# Patient Record
Sex: MALE | URBAN - METROPOLITAN AREA
[De-identification: names, ages, dates, MRNs, and addresses within clinical notes are randomized per-mention and may not be internally consistent; named-entity substitution may affect disease eponyms.]

---

## 2017-06-07 ENCOUNTER — THERAPY VISIT (OUTPATIENT)
Dept: PHYSICAL THERAPY | Facility: CLINIC | Age: 57
End: 2017-06-07
Payer: MEDICARE

## 2017-06-07 DIAGNOSIS — E11.9 DIABETES MELLITUS, TYPE 2 (H): ICD-10-CM

## 2017-06-07 DIAGNOSIS — M79.671 CHRONIC PAIN IN RIGHT FOOT: Primary | ICD-10-CM

## 2017-06-07 DIAGNOSIS — G89.29 CHRONIC PAIN IN RIGHT FOOT: Primary | ICD-10-CM

## 2017-06-07 PROCEDURE — 97110 THERAPEUTIC EXERCISES: CPT | Mod: GP | Performed by: PHYSICAL THERAPIST

## 2017-06-07 PROCEDURE — G8979 MOBILITY GOAL STATUS: HCPCS | Mod: GP | Performed by: PHYSICAL THERAPIST

## 2017-06-07 PROCEDURE — G8978 MOBILITY CURRENT STATUS: HCPCS | Mod: GP | Performed by: PHYSICAL THERAPIST

## 2017-06-07 NOTE — LETTER
DEPARTMENT OF HEALTH AND HUMAN SERVICES  CENTERS FOR MEDICARE & MEDICAID SERVICES    PLAN/UPDATED PLAN OF PROGRESS FOR OUTPATIENT REHABILITATION    PATIENTS NAME:  Edis Card   : 1960  PROVIDER NUMBER:    [id number]  [id number]   PROVIDER NAME: [facility]  MEDICAL RECORD NUMBER: [id number]   START OF CARE DATE:  SOC Date: 17   TYPE:  PT    PRIMARY/TREATMENT DIAGNOSIS: (Pertinent Medical Diagnosis)     Chronic pain in right foot  Diabetes mellitus, type 2 (H)    VISITS FROM START OF CARE:  Rxs Used: 1     [facility] Initial Evaluation  Subjective:  Patient is a 56 year old male presenting with rehab right ankle/foot hpi. The history is provided by the patient. A  was used.   Edis Card is a 56 year old male with a right ankle and right foot condition.  Condition occurred with:  Insidious onset.  Condition occurred: for unknown reasons.  This is a chronic condition  MD Referral 17  Edis reports pain R>L foot beginning 1 year ago.   Has experienced 2 strokes affecting right side of body . Gradually returning to work and adding hours. Currently working 4 hours in cleaning. Edis's goal is to return to work 8 hours.   Edis did not want to come to therapy today .    Patient reports pain:  Longitudinal arch, lateral and medial.    Pain is described as aching and is intermittent (with tylenol ) and reported as 7/10.  Associated symptoms:  Numbness and loss of strength. Pain is worse in the P.M. (only able to work 4 hours).  Symptoms are exacerbated by standing, walking, activity and weight bearing and relieved by rest (tylenol and plans to start taking gamapentin).  Since onset symptoms are gradually worsening.  Special testing: no imaging.  Previous treatment includes physical therapy (instructed in exercises 2017 @ [facility]).  There was no improvement following previous treatment.  General health as reported by patient is  fair.       Red flags:  Calf pain, swelling, warmth, unexplained weight loss, pain at rest/night, chest pain and bilateral numbness.      Objective:  Standing Alignment:    Ankle/Foot:  Normal  Gait:    Gait Type:  Antalgic     Deviations:  General Deviations:  Base of support incr  Flexibility/Screens:   Lower Extremity:  Decreased right lower extremity flexibility:  Hamstrings and Gastroc    Ankle/Foot Evaluation  ROM:    PROM:    Dorsiflexion:  Left:    5     Right:   5   Great Toe Extension:  Left:    90     Right: 90  Strength wnl ankle: Only able to perform 3 single leg raises right LE.  LIGAMENT TESTING: normal  SPECIAL TESTS: not assessed  PALPATION:   Right ankle tenderness present at:   gastroc/soleus and plantar fascia  Right ankle tenderness not present at:  medial calcaneal  PATIENTS NAME:  Edis Card   : 1960    EDEMA: normal  MOBILITY TESTING: not assessed  FUNCTIONAL TESTS: not assessed      General Evaluation:  Balance:    Single Leg Stance--Eyes Open:  Left: 2/30 sec    Right: 2/30 sec  Single Leg Stance--Eyes Closed:  Left: 0/30 sec    Right: 0/30 sec    Sit to Stand Test: 7 ( with foot and back pain)/reps  Endurance Assessment:    Resting Heart Rate: 80/bpm at rest   /bpm after activity    Assessment/Plan:    Patient is a 56 year old male with Right Foot and Ankle Pain/Weakness/Numbness complaints.    Patient has the following significant findings with corresponding treatment plan.                Diagnosis 1:  Neuropathy per MD   Pain -  US, manual therapy, self management, education and home program  Decreased ROM/flexibility - manual therapy, therapeutic exercise, therapeutic activity and home program  Decreased strength - therapeutic exercise, therapeutic activities and home program  Impaired balance - neuro re-education, gait training, therapeutic activities and home program  Decreased proprioception - neuro re-education, gait training, therapeutic activities and home  program  Impaired gait - gait training and home program  Impaired muscle performance - neuro re-education and home program  Decreased function - therapeutic activities and home program    Therapy Evaluation Codes:   1) History comprised of:   Personal factors that impact the plan of care:      Language, Past/current experiences, Time since onset of symptoms and Work status.    Comorbidity factors that impact the plan of care are:      Chest pain, Diabetes, High blood pressure, Numbness/tingling, Pain at night/rest, Stroke, Weakness and Unexplained Weight Loss and Kidney Disease.     Medications impacting care: Gamapentin and Tylenol.  2) Examination of Body Systems comprised of:   Body structures and functions that impact the plan of care:      Right Foot.   Activity limitations that impact the plan of care are:      Standing, Walking and Working.  3) Clinical presentation characteristics are:   Unstable/Unpredictable.  4) Decision-Making    High complexity using standardized patient assessment instrument and/or measureable assessment of functional outcome.  Cumulative Therapy Evaluation is: High complexity.    Previous and current functional limitations:  (See Goal Flow Sheet for this information)    Short term and Long term goals: (See Goal Flow Sheet for this information)     Communication ability:  Patient has an  for communication clarity.  Treatment Explanation - The following has been discussed with the patient:     RX ordered/plan of care  Anticipated outcomes  Possible risks and side effects  This patient would benefit from PT intervention to resume normal activities.   Rehab potential is fair.      PATIENTS NAME:  Edis Card   : 1960    Frequency:  1 X week, once daily  Duration:  for 6 weeks  Discharge Plan:  Achieve all LTG.  Independent in home treatment program.  Reach maximal therapeutic benefit.  Progress from 4 hours to 8 hours of work.      Caregiver Signature/Credentials  "_____________________________ Date ________      Treating Provider: Yany Luna PT   I have reviewed and certified the need for these services and plan of treatment while under my care.        PHYSICIAN'S SIGNATURE:   _________________________________________  Date___________     Brooke Maza    Certification period:  Beginning of Cert date period: 06/07/17 to  End of Cert period date: 09/04/17     Functional Level Progress Report: Please see attached \"Goal Flow sheet for Functional level.\"    ____X____ Continue Services or       ________ DC Services                Service dates: From  SOC Date: 06/07/17 date to present                         "

## 2017-06-07 NOTE — PROGRESS NOTES
[facility] Initial Evaluation    Subjective:    Patient is a 56 year old male presenting with rehab right ankle/foot hpi. The history is provided by the patient. A  was used.   Edis Card is a 56 year old male with a right ankle and right foot condition.  Condition occurred with:  Insidious onset.  Condition occurred: for unknown reasons.  This is a chronic condition  MD Referral 5-25-17  Edis reports pain R>L foot beginning 1 year ago.   Has experienced 2 strokes affecting right side of body 2011. Gradually returning to work and adding hours. Currently working 4 hours in cleaning. Edis's goal is to return to work 8 hours.   Edis did not want to come to therapy today .    Patient reports pain:  Longitudinal arch, lateral and medial.    Pain is described as aching and is intermittent (with tylenol ) and reported as 7/10.  Associated symptoms:  Numbness and loss of strength. Pain is worse in the P.M. (only able to work 4 hours).  Symptoms are exacerbated by standing, walking, activity and weight bearing and relieved by rest (tylenol and plans to start taking gamapentin).  Since onset symptoms are gradually worsening.  Special testing: no imaging.  Previous treatment includes physical therapy (instructed in exercises Jan 2017 @ [facility]).  There was no improvement following previous treatment.  General health as reported by patient is fair.                      Red flags:  Calf pain, swelling, warmth, unexplained weight loss, pain at rest/night, chest pain and bilateral numbness.                        Objective:    Standing Alignment:                Ankle/Foot:  Normal    Gait:    Gait Type:  Antalgic     Deviations:  General Deviations:  Base of support incr    Flexibility/Screens:       Lower Extremity:      Decreased right lower extremity flexibility:  Hamstrings and Gastroc          Ankle/Foot Evaluation  ROM:      PROM:    Dorsiflexion:  Left:    5     Right:   5            Great Toe Extension:  Left:    90     Right: 90      Strength wnl ankle: Only able to perform 3 single leg raises right LE.  LIGAMENT TESTING: normal              SPECIAL TESTS: not assessed    PALPATION:     Right ankle tenderness present at:   gastroc/soleus and plantar fascia  Right ankle tenderness not present at:  medial calcaneal  EDEMA: normal          MOBILITY TESTING: not assessed              FUNCTIONAL TESTS: not assessed                                                                  General Evaluation:                      Balance:    Single Leg Stance--Eyes Open:  Left: 2/30 sec    Right: 2/30 sec  Single Leg Stance--Eyes Closed:  Left: 0/30 sec    Right: 0/30 sec    Sit to Stand Test: 7 ( with foot and back pain)/reps        Endurance Assessment:    Resting Heart Rate: 80/bpm at rest   /bpm after activity                                             ROS    Assessment/Plan:      Patient is a 56 year old male with Right Foot and Ankle Pain/Weakness/Numbness complaints.    Patient has the following significant findings with corresponding treatment plan.                Diagnosis 1:  Neuropathy per MD   Pain -  US, manual therapy, self management, education and home program  Decreased ROM/flexibility - manual therapy, therapeutic exercise, therapeutic activity and home program  Decreased strength - therapeutic exercise, therapeutic activities and home program  Impaired balance - neuro re-education, gait training, therapeutic activities and home program  Decreased proprioception - neuro re-education, gait training, therapeutic activities and home program  Impaired gait - gait training and home program  Impaired muscle performance - neuro re-education and home program  Decreased function - therapeutic activities and home program    Therapy Evaluation Codes:   1) History comprised of:   Personal factors that impact the plan of care:      Language, Past/current experiences, Time since onset of  symptoms and Work status.    Comorbidity factors that impact the plan of care are:      Chest pain, Diabetes, High blood pressure, Numbness/tingling, Pain at night/rest, Stroke, Weakness and Unexplained Weight Loss and Kidney Disease.     Medications impacting care: Gamapentin and Tylenol.  2) Examination of Body Systems comprised of:   Body structures and functions that impact the plan of care:      Right Foot.   Activity limitations that impact the plan of care are:      Standing, Walking and Working.  3) Clinical presentation characteristics are:   Unstable/Unpredictable.  4) Decision-Making    High complexity using standardized patient assessment instrument and/or measureable assessment of functional outcome.  Cumulative Therapy Evaluation is: High complexity.    Previous and current functional limitations:  (See Goal Flow Sheet for this information)    Short term and Long term goals: (See Goal Flow Sheet for this information)     Communication ability:  Patient has an  for communication clarity.  Treatment Explanation - The following has been discussed with the patient:   RX ordered/plan of care  Anticipated outcomes  Possible risks and side effects  This patient would benefit from PT intervention to resume normal activities.   Rehab potential is fair.    Frequency:  1 X week, once daily  Duration:  for 6 weeks  Discharge Plan:  Achieve all LTG.  Independent in home treatment program.  Reach maximal therapeutic benefit.  Progress from 4 hours to 8 hours of work.    Please refer to the daily flowsheet for treatment today, total treatment time and time spent performing 1:1 timed codes.

## 2017-06-08 NOTE — PROGRESS NOTES
Subjective:    Patient is a 56 year old male presenting with rehab left ankle/foot hpi.                                      Pertinent medical history includes:  High blood pressure, stroke and diabetes.    Other surgeries include:  Other.    Current occupation is Cleaning.                                    Objective:    System    Physical Exam    General     ROS    Assessment/Plan:

## 2017-09-12 PROBLEM — E11.9 DIABETES MELLITUS, TYPE 2 (H): Status: RESOLVED | Noted: 2017-06-07 | Resolved: 2017-09-12

## 2017-09-12 PROBLEM — M79.671 CHRONIC PAIN IN RIGHT FOOT: Status: RESOLVED | Noted: 2017-06-07 | Resolved: 2017-09-12

## 2017-09-12 PROBLEM — G89.29 CHRONIC PAIN IN RIGHT FOOT: Status: RESOLVED | Noted: 2017-06-07 | Resolved: 2017-09-12

## 2017-09-12 NOTE — PROGRESS NOTES
Please refer to the initial evaluation completed on 6-8-17 for discharge information. No further appts scheduled.

## 2018-05-03 ENCOUNTER — THERAPY VISIT (OUTPATIENT)
Dept: PHYSICAL THERAPY | Facility: CLINIC | Age: 58
End: 2018-05-03
Payer: MEDICARE

## 2018-05-03 DIAGNOSIS — M54.16 LUMBAR BACK PAIN WITH RADICULOPATHY AFFECTING RIGHT LOWER EXTREMITY: Primary | ICD-10-CM

## 2018-05-03 PROCEDURE — 97112 NEUROMUSCULAR REEDUCATION: CPT | Mod: GP | Performed by: PHYSICAL THERAPIST

## 2018-05-03 PROCEDURE — 97161 PT EVAL LOW COMPLEX 20 MIN: CPT | Mod: GP | Performed by: PHYSICAL THERAPIST

## 2018-05-03 PROCEDURE — G8979 MOBILITY GOAL STATUS: HCPCS | Mod: GP | Performed by: PHYSICAL THERAPIST

## 2018-05-03 PROCEDURE — 97110 THERAPEUTIC EXERCISES: CPT | Mod: GP | Performed by: PHYSICAL THERAPIST

## 2018-05-03 PROCEDURE — G8978 MOBILITY CURRENT STATUS: HCPCS | Mod: GP | Performed by: PHYSICAL THERAPIST

## 2018-05-03 NOTE — MR AVS SNAPSHOT
"              After Visit Summary   5/3/2018    Edis Card    MRN: [id number]           Patient Information     Date Of Birth          1960        Visit Information        Provider Department      5/3/2018 7:30 AM Kasia Bass, PT; [employer] [facility]        Today's Diagnoses     Lumbar back pain with radiculopathy affecting right lower extremity    -  1       Follow-ups after your visit        Your next 10 appointments already scheduled     May 17, 2018  9:40 AM CDT   WONG Spine with Kasia Bass PT   [facility] ([facility]  )    [address] [facility] [address]   [phone]            May 24, 2018 10:50 AM CDT   WONG Spine with Kasia Bass PT   [facility] ([facility]  )    [address] [facility] [id number] [phone]              Who to contact     If you have questions or need follow up information about today's clinic visit or your schedule please contact [facility] directly at [phone].  Normal or non-critical lab and imaging results will be communicated to you by MyChart, letter or phone within 4 business days after the clinic has received the results. If you do not hear from us within 7 days, please contact the clinic through [employer] or phone. If you have a critical or abnormal lab result, we will notify you by phone as soon as possible.  Submit refill requests through [employer] or call your pharmacy and they will forward the refill request to us. Please allow 3 business days for your refill to be completed.          Additional Information About Your Visit        [employer] Information     [employer] lets you send messages to your doctor, view your test results, renew your prescriptions, schedule appointments and more. To sign up, go to [email or web address] . Click on \"Log in\" on the " "left side of the screen, which will take you to the Welcome page. Then click on \"Sign up Now\" on the right side of the page.     You will be asked to enter the access code listed below, as well as some personal information. Please follow the directions to create your username and password.     Your access code is: [email or web address]  Expires: 2018  9:23 PM     Your access code will  in 90 days. If you need help or a new code, please call your [facility] clinic or [phone].        Care EveryWhere ID     This is your Care EveryWhere ID. This could be used by other organizations to access your [facility] medical records  [id number]         Blood Pressure from Last 3 Encounters:   No data found for BP    Weight from Last 3 Encounters:   No data found for Wt              We Performed the Following     HC PT EVAL, LOW COMPLEXITY     WONG CERT REPORT     NEUROMUSCULAR RE-EDUCATION     THERAPEUTIC EXERCISES        Primary Care Provider Office Phone # Fax #    Brooke Maza [phone]       [facility] [address] [facility] [address]        Equal Access to Services     JENN MEZA : Hadii aad ku hadasho Sotosha, waaxda luqadaha, qaybta kaalmada ademaria fernanda, katherin sadler . So [facility] [phone].    ATENCIÓN: Si habla español, tiene a nguyen disposición servicios gratuitos de asistencia lingüística. [facility] [phone].    We comply with applicable federal civil rights laws and [place] laws. We do not discriminate on the basis of race, color, national origin, age, disability, sex, sexual orientation, or gender identity.            Thank you!     Thank you for choosing [facility]  for your care. Our goal is always to provide you with excellent care. Hearing back from our patients is one way we can continue to improve our services. Please take a few minutes to complete the written survey that you may receive in the mail " after your visit with us. Thank you!             Your Updated Medication List - Protect others around you: Learn how to safely use, store and throw away your medicines at www.disposemymeds.org.      Notice  As of 5/3/2018  9:23 PM    You have not been prescribed any medications.

## 2018-05-03 NOTE — PROGRESS NOTES
[facility] Initial Evaluation  Subjective:  Patient is a 57 year old male presenting with rehab back hpi. The history is provided by the patient. The history is limited by a language barrier. A  was used.   Edis Card is a 57 year old male with a lumbar condition.  Condition occurred with:  Insidious onset.  Condition occurred: for unknown reasons.  This is a recurrent and chronic condition  Patient presents to PT today with c/o LBP and pain and swelling in legs (R>L leg); Patient is unsure when or how the pain started.  Patient has a history of back injury (work in 2011) and has had 2 strokes.         Referred to PT 4/19/18.    Patient reports pain:  Lumbar spine right and central lumbar spine.  Radiates to:  Gluteals right, thigh right, knee right, lower leg right and foot right.  Pain is described as aching, other and burning (swelling) and is constant and reported as 7/10 and 8/10.  Associated symptoms:  Loss of motion/stiffness, edema and other (falling). Pain is the same all the time.  Symptoms are exacerbated by bending, sitting, walking and standing and relieved by rest.  Since onset symptoms are unchanged.  Special tests:  X-ray.      General health as reported by patient is poor.  Pertinent medical history includes:  Osteoarthritis, history of fractures, diabetes, high blood pressure, stroke, kidney disease and other (Gout).  Medical allergies: none reported.  Other surgeries include:  Orthopedic surgery (arm).  Current medications:  Cardiac medication, anti-inflammatory and high blood pressure medication.  Current occupation is Currently not working  .        Barriers include:  None as reported by the patient.    Red flags:  Bilateral numbness, pain at rest/night, significant weakness and calf pain, swelling, warmth.                        Objective:  Standing Alignment:    Cervical/Thoracic:  Forward head  Shoulder/UE:  Rounded shoulders  Lumbar:  Lordosis  decr                           Lumbar/SI Evaluation  ROM:    AROM Lumbar:   Flexion:          Mid shin  Ext:                    Mod loss   Side Bend:        Left:  Mid thigh    Right:  Mid thigh  Rotation:           Left:     Right:   Side Glide:        Left:     Right:           Lumbar Myotomes:  Lumbar myotomes: MMT difficult due to language barrier even with  present.  T12-L3 (Hip Flex):  Left: 4    Right: 4  L2-4 (Quads):  Left:  4    Right:  4  L4 (Ankle DF):  Left:  5    Right:  5      Lumbar DTR's:  normal        Lumbar Dermtomes:  normal                Neural Tension/Mobility:  Neural tension wnl lumbar: has pain with just lifting the legs.    Left side:  SLR positive.   Right side:   SLR positive.  Lumbar Palpation:    Tenderness present at Left:    Quadratus Lumborum and Erector Spinae  Tenderness present at Right: Quadratus Lumborum and Erector Spinae                                                     General     ROS    Assessment/Plan:    Patient is a 57 year old male with lumbar complaints.    Patient has the following significant findings with corresponding treatment plan.                Diagnosis 1:  LBP with leg pain  Pain -  hot/cold therapy, US, electric stimulation, mechanical traction and manual therapy  Decreased ROM/flexibility - manual therapy and therapeutic exercise  Decreased strength - therapeutic exercise and therapeutic activities  Impaired muscle performance - neuro re-education  Decreased function - therapeutic activities  Impaired posture - neuro re-education    Therapy Evaluation Codes:   1) History comprised of:   Personal factors that impact the plan of care:      Language and Past/current experiences.    Comorbidity factors that impact the plan of care are:      Diabetes, Heart problems, High blood pressure and Stroke.     Medications impacting care: None.  2) Examination of Body Systems comprised of:   Body structures and functions that impact the plan of care:       Lumbar spine.   Activity limitations that impact the plan of care are:      Bending, Lifting, Sitting, Squatting/kneeling, Stairs, Standing, Walking, Working and Laying down.  3) Clinical presentation characteristics are:   Evolving/Changing.  4) Decision-Making    Low complexity using standardized patient assessment instrument and/or measureable assessment of functional outcome.  Cumulative Therapy Evaluation is: Low complexity.    Previous and current functional limitations:  (See Goal Flow Sheet for this information)    Short term and Long term goals: (See Goal Flow Sheet for this information)     Communication ability:  Patient has an  for communication clarity.  Treatment Explanation - The following has been discussed with the patient:   RX ordered/plan of care  Anticipated outcomes  Possible risks and side effects  This patient would benefit from PT intervention to resume normal activities.   Rehab potential is good.    Frequency:  1 X week, once daily  Duration:  for 6 weeks  Discharge Plan:  Achieve all LTG.  Independent in home treatment program.  Reach maximal therapeutic benefit.    Please refer to the daily flowsheet for treatment today, total treatment time and time spent performing 1:1 timed codes.

## 2018-05-03 NOTE — LETTER
DEPARTMENT OF HEALTH AND HUMAN SERVICES  CENTERS FOR MEDICARE & MEDICAID SERVICES    PLAN/UPDATED PLAN OF PROGRESS FOR OUTPATIENT REHABILITATION    PATIENTS NAME:  Edis Card   : 1960  PROVIDER NUMBER:    [id number]  [id number]   PROVIDER NAME: [place] [facility] CARLEEN INTERIANO  MEDICAL RECORD NUMBER: [id number]   START OF CARE DATE:  SOC Date: 18   TYPE:  PT  PRIMARY/TREATMENT DIAGNOSIS: (Pertinent Medical Diagnosis)  Lumbar back pain with radiculopathy affecting right lower extremity  VISITS FROM START OF CARE:  Rxs Used: 1     [place] [facility] Initial Evaluation  Subjective:  Patient is a 57 year old male presenting with rehab back hpi. The history is provided by the patient. The history is limited by a language barrier. A  was used.   Edis Card is a 57 year old male with a lumbar condition.  Condition occurred with:  Insidious onset.  Condition occurred: for unknown reasons.  This is a recurrent and chronic condition  Patient presents to PT today with c/o LBP and pain and swelling in legs (R>L leg); Patient is unsure when or how the pain started.  Patient has a history of back injury (work in ) and has had 2 strokes.  Referred to PT 18.    Patient reports pain:  Lumbar spine right and central lumbar spine.  Radiates to:  Gluteals right, thigh right, knee right, lower leg right and foot right.  Pain is described as aching, other and burning (swelling) and is constant and reported as 7/10 and 8/10.  Associated symptoms:  Loss of motion/stiffness, edema and other (falling). Pain is the same all the time.  Symptoms are exacerbated by bending, sitting, walking and standing and relieved by rest.  Since onset symptoms are unchanged.  Special tests:  X-ray.      General health as reported by patient is poor.  Pertinent medical history includes:  Osteoarthritis, history of fractures, diabetes, high blood pressure, stroke, kidney  disease and other (Gout).  Medical allergies: none reported.  Other surgeries include:  Orthopedic surgery (arm).  Current medications:  Cardiac medication, anti-inflammatory and high blood pressure medication.  Current occupation is Currently not working   Barriers include:  None as reported by the patient.  Red flags:  Bilateral numbness, pain at rest/night, significant weakness and calf pain, swelling, warmth.    Objective:  Standing Alignment:    Cervical/Thoracic:  Forward head  Shoulder/UE:  Rounded shoulders  Lumbar:  Lordosis decr    Lumbar/SI Evaluation  ROM:    AROM Lumbar:   Flexion:          Mid shin  Ext:                    Mod loss   Side Bend:        Left:  Mid thigh    Right:  Mid thigh  Rotation:           Left:     Right:   Side Glide:        Left:     Right:        Lumbar Myotomes:  Lumbar myotomes: MMT difficult due to language barrier even with  present.  T12-L3 (Hip Flex):  Left: 4    Right: 4  L2-4 (Quads):  Left:  4    Right:  4  L4 (Ankle DF):  Left:  5    Right:  5  Lumbar DTR's:  normal  Lumbar Dermtomes:  normal  Neural Tension/Mobility:  Neural tension wnl lumbar: has pain with just lifting the legs.    Left side:  SLR positive.   Right side:   SLR positive.  Lumbar Palpation:    Tenderness present at Left:    Quadratus Lumborum and Erector Spinae  Tenderness present at Right: Quadratus Lumborum and Erector Spinae         PATIENTS NAME:  Edis Card            : 1960  Assessment/Plan:    Patient is a 57 year old male with lumbar complaints.    Patient has the following significant findings with corresponding treatment plan.                Diagnosis 1:  LBP with leg pain  Pain -  hot/cold therapy, US, electric stimulation, mechanical traction and manual therapy  Decreased ROM/flexibility - manual therapy and therapeutic exercise  Decreased strength - therapeutic exercise and therapeutic activities  Impaired muscle performance - neuro re-education  Decreased function  - therapeutic activities  Impaired posture - neuro re-education    Therapy Evaluation Codes:   1) History comprised of:   Personal factors that impact the plan of care:      Language and Past/current experiences.    Comorbidity factors that impact the plan of care are:      Diabetes, Heart problems, High blood pressure and Stroke.     Medications impacting care: None.  2) Examination of Body Systems comprised of:   Body structures and functions that impact the plan of care:      Lumbar spine.   Activity limitations that impact the plan of care are:      Bending, Lifting, Sitting, Squatting/kneeling, Stairs, Standing, Walking, Working and Laying down.  3) Clinical presentation characteristics are:   Evolving/Changing.  4) Decision-Making    Low complexity using standardized patient assessment instrument and/or measureable assessment of functional outcome.  Cumulative Therapy Evaluation is: Low complexity.    Previous and current functional limitations:  (See Goal Flow Sheet for this information)    Short term and Long term goals: (See Goal Flow Sheet for this information)   Communication ability:  Patient has an  for communication clarity.  Treatment Explanation - The following has been discussed with the patient:   RX ordered/plan of care  Anticipated outcomes  Possible risks and side effects  This patient would benefit from PT intervention to resume normal activities.   Rehab potential is good.  Frequency:  1 X week, once daily  Duration:  for 6 weeks  Discharge Plan:  Achieve all LTG.  Independent in home treatment program.  Reach maximal therapeutic benefit.    Please refer to the daily flowsheet for treatment today, total treatment time and time spent performing 1:1 timed codes.       Caregiver Signature/Credentials ______________________________________ Date ________        Kasia Bass, [facility]   I have reviewed and certified the need for these services and plan of treatment while under my care.     "  PHYSICIAN'S SIGNATURE:   _____________________________________  Date___________     Brooke Maza MD    Certification period:  Beginning of Cert date period: 05/03/18 to  End of Cert period date: 07/31/18     Functional Level Progress Report: Please see attached \"Goal Flow sheet for Functional level.\"    ____X____ Continue Services or       ________ DC Services                Service dates: From  SOC Date: 05/03/18 date to present                         "